# Patient Record
Sex: FEMALE | Race: OTHER | HISPANIC OR LATINO | Employment: UNEMPLOYED | URBAN - METROPOLITAN AREA
[De-identification: names, ages, dates, MRNs, and addresses within clinical notes are randomized per-mention and may not be internally consistent; named-entity substitution may affect disease eponyms.]

---

## 2019-09-18 DIAGNOSIS — N83.209 OVARIAN CYST: Primary | ICD-10-CM

## 2019-10-10 ENCOUNTER — OFFICE VISIT (OUTPATIENT)
Dept: OBSTETRICS AND GYNECOLOGY | Facility: CLINIC | Age: 34
End: 2019-10-10
Payer: MEDICAID

## 2019-10-10 VITALS
BODY MASS INDEX: 34.38 KG/M2 | DIASTOLIC BLOOD PRESSURE: 79 MMHG | SYSTOLIC BLOOD PRESSURE: 113 MMHG | WEIGHT: 194 LBS | HEART RATE: 99 BPM | HEIGHT: 63 IN

## 2019-10-10 DIAGNOSIS — D21.9 FIBROIDS: ICD-10-CM

## 2019-10-10 DIAGNOSIS — N94.10 DYSPAREUNIA, FEMALE: Primary | ICD-10-CM

## 2019-10-10 DIAGNOSIS — N83.209 CYST OF OVARY, UNSPECIFIED LATERALITY: ICD-10-CM

## 2019-10-10 DIAGNOSIS — R10.2 PELVIC PAIN: ICD-10-CM

## 2019-10-10 PROCEDURE — 99204 OFFICE O/P NEW MOD 45 MIN: CPT | Mod: S$GLB,,, | Performed by: OBSTETRICS & GYNECOLOGY

## 2019-10-10 PROCEDURE — 99204 PR OFFICE/OUTPT VISIT, NEW, LEVL IV, 45-59 MIN: ICD-10-PCS | Mod: S$GLB,,, | Performed by: OBSTETRICS & GYNECOLOGY

## 2019-10-10 NOTE — PROGRESS NOTES
Subjective:       Patient ID: Miguelina Argueta is a 34 y.o. female.    Chief Complaint:  Ovarian Cyst      History of Present Illness  HPI  Patient is seen as a consult.  She was having upper abdominal pain and had an ultrasound.  She had an abnormal gallbladder which was removed. Incidentally they found an ovarian cyst that was 1 cm.  Her uterus was normal size but did have some small fibroids.  She complains of occasional lower abdominal pain. She complains of occasional dyspareunia.  Her menstrual cycles are normal and she has had a tubal ligation.    GYN & OB History  Patient's last menstrual period was 2019 (exact date).   Date of Last Pap: No result found    OB History    Para Term  AB Living   3 3 3     3   SAB TAB Ectopic Multiple Live Births                  # Outcome Date GA Lbr Scar/2nd Weight Sex Delivery Anes PTL Lv   3 Term            2 Term            1 Term                Review of Systems  Review of Systems   Constitutional: Negative for chills and fever.   Respiratory: Negative for shortness of breath.    Cardiovascular: Negative for chest pain.   Gastrointestinal: Positive for abdominal pain. Negative for blood in stool, constipation, diarrhea, nausea, vomiting and reflux.   Genitourinary: Positive for dyspareunia and pelvic pain. Negative for dysmenorrhea, dysuria, hematuria, hot flashes, menorrhagia, menstrual problem, vaginal bleeding, vaginal discharge, postcoital bleeding and vaginal dryness.   Musculoskeletal: Negative for arthralgias and joint swelling.   Integumentary:  Negative for rash and hair changes.   Psychiatric/Behavioral: Negative for depression. The patient is not nervous/anxious.            Objective:    Physical Exam:   Constitutional: She appears well-developed and well-nourished. No distress.    HENT:   Head: Normocephalic and atraumatic.    Eyes: Conjunctivae and EOM are normal.    Neck: No tracheal deviation present. No thyromegaly present.     Cardiovascular: Exam reveals no clubbing, no cyanosis and no edema.     Pulmonary/Chest: Effort normal. No respiratory distress.        Abdominal: Soft. She exhibits no distension and no mass. There is no tenderness. There is no rebound and no guarding. No hernia.     Genitourinary: Rectum normal, vagina normal and uterus normal. Pelvic exam was performed with patient supine. There is no rash, tenderness, lesion or injury on the right labia. There is no rash, tenderness, lesion or injury on the left labia. Cervix is normal. Right adnexum displays no mass, no tenderness and no fullness. Left adnexum displays no mass, no tenderness and no fullness.                Skin: She is not diaphoretic. No cyanosis. Nails show no clubbing.           Assessment:        1. Dyspareunia, female    2. Cyst of ovary, unspecified laterality    3. Pelvic pain    4. Fibroids               Plan:      No surgery indicated at this time  Pt will f/u if sx worsen

## 2025-06-05 NOTE — LETTER
Called and gave report to Jessica NESBITT with [facility].  
October 10, 2019      Lavinia Daniels, APRN  [address]  [place] [address]           [facility]  [address]  [place] [address]  Phone: [phone]  Fax: [phone]          Patient: Miguelina Argueta   MR Number: [id number]   YOB: 1985   Date of Visit: 10/10/2019       Dear Lavinia Daniels:    Thank you for referring Miguelina Argueta to me for evaluation. Attached you will find relevant portions of my assessment and plan of care.    If you have questions, please do not hesitate to call me. I look forward to following Miguelina Argueta along with you.    Sincerely,    Don Anton MD    Enclosure  CC:  No Recipients    If you would like to receive this communication electronically, please contact [email or web address] or [phone] to request more information on [employer] access.    For providers and/or their staff who would like to refer a patient to [facility], please contact us through our one-stop-shop provider referral line, Vania Fernandez, at [phone].    If you feel you have received this communication in error or would no longer like to receive these types of communications, please e-mail [email or web address]         
Spoke with Washington Jimenez, patient's son and informed him of transport to [facility] as well as room number and phone number of facility.   
EKG